# Patient Record
(demographics unavailable — no encounter records)

---

## 2024-11-19 NOTE — PHYSICAL EXAM
[de-identified] : General Appearance / Station: Well developed, well nourished, in no acute distress Orientation: Oriented to person, place, and time Gait & Station: Ambulates without assistive device Neurologic: Normal leg sensation Cardiovascular: Warm extremity Lymphatics: No lymphedema Generalized Ligament Laxity: Normal Stiffness: Normal.  LUMBAR SPINE Nontender at lumbar spine Straight leg raise: Negative Motor: 5/5 motor L2-S1 Sensation Intact. No paresthesias L2-S1  SYMPTOMATIC LEFT HIP Range of motion: PAINFUL internal and external rotation of the hip. Strength: Within Normal Limits. Negative Trendelenburg sign                                                                      FADIR: POSITIVE Stinchfield: POSITIVE, with groin pain Palpation: TENDER at greater trochanter, Nontender SI joint                    LEFT KNEE Alignment: Varus Skin: Normal.  Effusion: None Quadriceps: Normal Range of motion: Normal PF crepitus: 1+ PF apprehension: None Patella / Patella Tendon: None Palpation: Nontender Meniscus signs: Negative  ASYMPTOMATIC RIGHT HIP Range of motion: Painless internal and external rotation of the hip. Skin: Normal Strength: Within normal limits JAZLYN: Negative FADIR: Negative Stinchfield: Negative Palpation: Nontender at greater trochanter, Nontender at SI joint [de-identified] : Imaging: AP Pelvis and lateral views of the left hip show left hip severe osteoarthritis with loss of joint space, subchondral sclerosis, marginal osteophyte formations, and subchondral cyst. There are no signs of fracture. The soft tissues appear unremarkable

## 2024-11-19 NOTE — HISTORY OF PRESENT ILLNESS
[de-identified] : SAMIA SAVAGE  is an 61 year-old male presents today for follow-up of chief complaint of left hip pain. Patient describes onset over the last number of months, but it may be going on for longer.  Patient points to the hip, specifically in the groin and anterior thigh aspect as maximum point of tenderness. Pain is typically 5-6/10 in severity, dull and achy but sometimes sharp in quality with certain activities. Symptoms are exacerbated by activity, or even walking/standing. They are improved with rest and modifications of daily routines. Patient denies any radiation of symptoms beyond the surrounding area. Back and knee symptoms appear to be largely unrelated.   To address the pathology, they have tried OTC medications/NSAIDs with some relief, even though short lasting.  Exercise therapy actually makes things worse but may have allowed greater than 50 % range of motion. Putting on shoes and other similar activities of daily living are difficult. Things have gotten bad enough that patient will need assistive devices like the banister for going up and down stairs. They may need a cane.   At this point the patient is quite frustrated by their condition. As duration of symptoms exceeds years, they are here for further evaluation and discussion of possible diagnoses and management. They deny any further trauma. No fever or chills, no signs of infection. Today, patient does not state any other associated signs or complains outside of those described.   A complete review of symptoms as well as past medical/surgical history, medications, allergies, social and family history, and other details of HPI and exam were reviewed per first visit intake form and updated accordingly. Additional and more relevant details are noted in further detail today.

## 2024-11-19 NOTE — DISCUSSION/SUMMARY
[de-identified] : SAMIA SAVAGE  is an 61 year-old male with bone on bone arthritis of their Left Hip. The patient and I reviewed their chief complaint, history of present illness, radiology findings, differential diagnosis and the pros, cons, alternatives, risks, and benefits of further conservative treatment versus operative intervention. Based upon the patients continued symptoms and failure to respond to conservative treatment for greater than three months and after a shared decision making process, the patient would like to proceed with a Left Total Hip Replacement.   As a result of the replacement, the patients specific functional goal is to walk with less pain.  A long discussion took place with the patient describing what a total hip replacement is and what the procedure would entail. I explained that this is a major surgery with significant procedure risk factors. The benefits of surgery were discussed with the patient including the potential for improving his/her current clinical condition through operative intervention. Alternatives to surgical intervention including continued conservative management were also discussed in detail.     The patient  clearly understand that there is no guarantee of improvement with either treatment option, both carry risks including worsening pain. They also understand the indications and limitations of surgery and the need for post operative rehabilitation / recovery. Healing times vary greatly among patients and this was discussed. All patient questions were answered satisfactorily. Risks, benefits, and alternatives to surgery have been discussed with the patient including but not limited to bleeding and need for blood transfusion, infection, intraoperative or postoperative fracture, hardware failure, neurovascular injury, thigh or knee numbness, chronic pain and scarring, chronic tendonitis or swelling, dislocation, leg-length discrepancy, the potential need for future surgery, DVT, PE, heart attack, stroke and death. The patient was told that we will take steps to minimize these risks by using sterile technique, antibiotics and DVT prophylaxis when appropriate and follow the patient postoperatively in the office setting to monitor progress but we cannot eliminate these risks completely. The possibility of recurrent pain, no improvement in pain and actual worsening of pain were also discussed with the patient.    The patient demonstrates understanding and wishes to proceed. After our long discussion the final decision for surgery was made today. The patient will sit down with the surgical coordinator to discuss clearances and complete necessary paperwork.   During the shared decision making process, educational tools including implant model and patient x-rays were used to discuss implant type and function.  Ceramic on polyethylene implants from [employer]/ [employer], [employer]/ [employer], or [employer]  are the implants I am most comfortable utilizing in my primary total hip replacements and the implant I am planning for their total hip.  If the patient wishes to utilize a different implant brand or type for their total hip they may obtain an additional surgical opinion from a surgeon utilizing that brand of implant. The hospitalization and post-operative care and rehabilitation were also discussed. The use of perioperative antibiotics and DVT prophylaxis were discussed. The patient was also advised of risks related to the medical comorbidities and elevated body mass index (BMI).  The discharge plan of care focused on the patient going home following surgery and the importance to remove trip and fall hazards about the house prior to admission so that they decrease the risk of fall once discharged as a fall can be catastrophic after total joint replacement. I encouraged the patient to make the necessary arrangements to have someone stay with them when they are discharged home.  Home physical therapy will commence following discharge provided it is appropriate and covered by their health insurance benefit plan.     All questions were answered to the satisfaction of the patient. The patient agreed to the plan of care as well as the use of implants in their total joint replacement.   The patient was advised that they will require a medical preoperative risk evaluation by their PCP. Further medical subspecialty clearances may be indicated if felt needed by their PCP.

## 2025-03-19 NOTE — HISTORY OF PRESENT ILLNESS
[de-identified] : SAMIA SAVAGE  is an 61 year-old male presents today status post left total hip arthroplasty on 1/13/2025 for followup. The patient is living at home. The patient has maintained weight bearing as tolerated and hip precautions. The patient is ambulating with no assistive device and working with physical therapy. The patient has weaned off all narcotic pain medicine. The patient is progressing well and is happy with the progress.   No fever, chills, or signs of infection. Today, patient does not state any other associated signs or complaints outside of those described. The patient presents for postoperative followup.

## 2025-03-19 NOTE — DISCUSSION/SUMMARY
[de-identified] : Now s/p left HENRIQUE on 1/13/2025 for follow-up. Overall doing well.   At this point we have suggested continued exercises and return to normal activity. Appropriate instructions regarding further care, restrictions, and further recovery has been given. s.    We remain available for any further questions and concerns and instructed patient that we would like to see them if any concerns for infection including fevers, redness, or increased swelling.

## 2025-03-19 NOTE — PHYSICAL EXAM
[de-identified] : General Appearance / Station: Well developed, well nourished, in no acute distress Orientation: Oriented to person, place, and time Gait & Station: Ambulates with assistive device Neurologic: Normal leg sensation Cardiovascular: Warm extremity Lymphatics: No lymphedema  OPERATIVE HIP Skin: incision clean, dry and intact. Well healing. No erythema, warmth or tenderness. Range of motion: Painless internal and external rotation of the hip. Strength: Within Normal Limits. Negative Trendelenburg sign                                                                      Neurovascular Exam: Able to wiggle toes and dorsiflex/ plantarflex ankle. Foot warm, well perfused        [de-identified] : Imaging: AP Pelvis and lateral views of the left hip show satisfactory placement of a left cementless total hip arthroplasty. There are no changes in alignment of hardware and no signs of radiographic failure compared to previous radiographs.

## 2025-03-19 NOTE — PHYSICAL EXAM
[de-identified] : General Appearance / Station: Well developed, well nourished, in no acute distress Orientation: Oriented to person, place, and time Gait & Station: Ambulates with assistive device Neurologic: Normal leg sensation Cardiovascular: Warm extremity Lymphatics: No lymphedema  OPERATIVE HIP Skin: incision clean, dry and intact. Well healing. No erythema, warmth or tenderness. Range of motion: Painless internal and external rotation of the hip. Strength: Within Normal Limits. Negative Trendelenburg sign                                                                      Neurovascular Exam: Able to wiggle toes and dorsiflex/ plantarflex ankle. Foot warm, well perfused        [de-identified] : Imaging: AP Pelvis and lateral views of the left hip show satisfactory placement of a left cementless total hip arthroplasty. There are no changes in alignment of hardware and no signs of radiographic failure compared to previous radiographs.

## 2025-03-19 NOTE — HISTORY OF PRESENT ILLNESS
[de-identified] : SAMIA SAVAGE  is an 61 year-old male presents today status post left total hip arthroplasty on 1/13/2025 for followup. The patient is living at home. The patient has maintained weight bearing as tolerated and hip precautions. The patient is ambulating with no assistive device and working with physical therapy. The patient has weaned off all narcotic pain medicine. The patient is progressing well and is happy with the progress.   No fever, chills, or signs of infection. Today, patient does not state any other associated signs or complaints outside of those described. The patient presents for postoperative followup.

## 2025-03-19 NOTE — DISCUSSION/SUMMARY
[de-identified] : Now s/p left HENRIQUE on 1/13/2025 for follow-up. Overall doing well.   At this point we have suggested continued exercises and return to normal activity. Appropriate instructions regarding further care, restrictions, and further recovery has been given. s.    We remain available for any further questions and concerns and instructed patient that we would like to see them if any concerns for infection including fevers, redness, or increased swelling.

## 2025-05-14 NOTE — PHYSICAL EXAM
[de-identified] : General Appearance / Station: Well developed, well nourished, in no acute distress Orientation: Oriented to person, place, and time Gait & Station: Ambulates with assistive device Neurologic: Normal leg sensation Cardiovascular: Warm extremity Lymphatics: No lymphedema  OPERATIVE HIP Mild tenderness palpation over the trochanteric bursa Range of motion: Painless internal and external rotation of the hip. Strength: Within Normal Limits. Negative Trendelenburg sign                                                                      Neurovascular Exam: Able to wiggle toes and dorsiflex/ plantarflex ankle. Foot warm, well perfused

## 2025-05-14 NOTE — HISTORY OF PRESENT ILLNESS
[de-identified] : SAMIA SAVAGE  is an 61 year-old male presents today status post left total hip arthroplasty on 1/13/2025 for followup. The patient is living at home. The patient has maintained weight bearing as tolerated and hip precautions. The patient is ambulating with no assistive device.  He is overall doing well but does get some intermittent soreness with activities mainly on the lateral aspect of his hip   No fever, chills, or signs of infection. Today, patient does not state any other associated signs or complaints outside of those described. The patient presents for postoperative followup.

## 2025-05-14 NOTE — DISCUSSION/SUMMARY
[de-identified] : Now s/p left HENRIQUE on 1/13/2025 for follow-up. Overall doing well.  He does have some intermittent trochanteric bursitis in the left side he also has a history of sciatica and low back problems.  I recommended an anti-inflammatory Medrol Dosepak and then afterwards Aleve or ibuprofen as necessary.  I will see him back in 2 months for repeat evaluation or sooner should symptoms worsen.   At this point we have suggested continued exercises and return to normal activity. Appropriate instructions regarding further care, restrictions, and further recovery has been given.    We remain available for any further questions and concerns and instructed patient that we would like to see them if any concerns for infection including fevers, redness, or increased swelling.

## 2025-07-23 NOTE — DISCUSSION/SUMMARY
[de-identified] : Now s/p left HENRIQUE on 1/13/2025 for follow-up. Overall doing well.  Commend progress his activities as tolerated.  I will see him back for his one year appointment.   At this point we have suggested continued exercises and return to normal activity. Appropriate instructions regarding further care, restrictions, and further recovery has been given.    We remain available for any further questions and concerns and instructed patient that we would like to see them if any concerns for infection including fevers, redness, or increased swelling.

## 2025-07-23 NOTE — PHYSICAL EXAM
[de-identified] : General Appearance / Station: Well developed, well nourished, in no acute distress Orientation: Oriented to person, place, and time Gait & Station: Ambulates with assistive device Neurologic: Normal leg sensation Cardiovascular: Warm extremity Lymphatics: No lymphedema  OPERATIVE HIP incision healed well No TTP Range of motion: Painless internal and external rotation of the hip. Strength: Within Normal Limits. Negative Trendelenburg sign                                                                      Neurovascular Exam: Able to wiggle toes and dorsiflex/ plantarflex ankle. Foot warm, well perfused        [de-identified] : Imaging: AP Pelvis and lateral views of the left hip show satisfactory placement of a left cementless total hip arthroplasty. There are no changes in alignment of hardware and no signs of radiographic failure compared to previous radiographs.

## 2025-07-23 NOTE — DISCUSSION/SUMMARY
[de-identified] : Now s/p left HNERIQUE on 1/13/2025 for follow-up. Overall doing well.  Commend progress his activities as tolerated.  I will see him back for his one year appointment.   At this point we have suggested continued exercises and return to normal activity. Appropriate instructions regarding further care, restrictions, and further recovery has been given.    We remain available for any further questions and concerns and instructed patient that we would like to see them if any concerns for infection including fevers, redness, or increased swelling.

## 2025-07-23 NOTE — HISTORY OF PRESENT ILLNESS
[de-identified] : SAMIA SAVAGE  is an 61 year-old male presents today status post left total hip arthroplasty on 1/13/2025 for followup. The patient is living at home. The patient has maintained weight bearing as tolerated. The patient is ambulating with no assistive device.  He is overall doing well.  He has 0 out of 10 pain   No fever, chills, or signs of infection. Today, patient does not state any other associated signs or complaints outside of those described. The patient presents for postoperative followup.

## 2025-07-23 NOTE — HISTORY OF PRESENT ILLNESS
[de-identified] : SAMIA SAVAGE  is an 61 year-old male presents today status post left total hip arthroplasty on 1/13/2025 for followup. The patient is living at home. The patient has maintained weight bearing as tolerated. The patient is ambulating with no assistive device.  He is overall doing well.  He has 0 out of 10 pain   No fever, chills, or signs of infection. Today, patient does not state any other associated signs or complaints outside of those described. The patient presents for postoperative followup.

## 2025-07-23 NOTE — PHYSICAL EXAM
[de-identified] : General Appearance / Station: Well developed, well nourished, in no acute distress Orientation: Oriented to person, place, and time Gait & Station: Ambulates with assistive device Neurologic: Normal leg sensation Cardiovascular: Warm extremity Lymphatics: No lymphedema  OPERATIVE HIP incision healed well No TTP Range of motion: Painless internal and external rotation of the hip. Strength: Within Normal Limits. Negative Trendelenburg sign                                                                      Neurovascular Exam: Able to wiggle toes and dorsiflex/ plantarflex ankle. Foot warm, well perfused        [de-identified] : Imaging: AP Pelvis and lateral views of the left hip show satisfactory placement of a left cementless total hip arthroplasty. There are no changes in alignment of hardware and no signs of radiographic failure compared to previous radiographs.